# Patient Record
Sex: MALE | Race: BLACK OR AFRICAN AMERICAN | Employment: UNEMPLOYED | URBAN - METROPOLITAN AREA
[De-identification: names, ages, dates, MRNs, and addresses within clinical notes are randomized per-mention and may not be internally consistent; named-entity substitution may affect disease eponyms.]

---

## 2018-08-28 ENCOUNTER — HOSPITAL ENCOUNTER (EMERGENCY)
Age: 48
Discharge: HOME | End: 2018-08-28
Attending: EMERGENCY MEDICINE
Payer: MEDICAID

## 2018-08-28 VITALS
WEIGHT: 193.25 LBS | TEMPERATURE: 98.8 F | DIASTOLIC BLOOD PRESSURE: 80 MMHG | SYSTOLIC BLOOD PRESSURE: 141 MMHG | OXYGEN SATURATION: 98 % | RESPIRATION RATE: 20 BRPM | HEART RATE: 103 BPM

## 2018-08-28 DIAGNOSIS — R46.89 SPELL OF ABNORMAL BEHAVIOR: Primary | ICD-10-CM

## 2018-08-28 PROCEDURE — 99284 EMERGENCY DEPT VISIT MOD MDM: CPT

## 2018-08-28 NOTE — ED NOTES
Per patient request, mother called and updated on patient's condition     Thomas Stark RN  08/28/18 [id number]

## 2018-08-28 NOTE — CARE COORDINATION
Per ED Physician, pt is from [facility] and has been having behavioral problems. SW met with pt regarding concerns and provided support. Pt relayed that he does not want to be at [facility]. Stated that he was at a facility in [place], but sent here due to his behavior. Lottie sister (POA) PA#[phone]. Relayed that he has spoke with his sister about his concerns and she relayed that he will have to stay there for awhile. After discussion with pt, relayed that his goal is to get back to [place] to be by his family, he relayed that he will work on his behavior and SW relayed that she can relay concerns to [facility]. Pt willing to have SW contact [facility] and relay that his ultimitate plan is to return to [place]. SW relayed information to Justine Dean at [facility].      Electronically signed by DIANE Espinal on 8/28/2018 at 5:43 PM

## 2018-08-28 NOTE — ED NOTES
Bed: 03  Expected date: 8/28/18  Expected time:   Means of arrival:   Comments:     Ronda Trejo RN  08/28/18 [id number]